# Patient Record
Sex: FEMALE | Race: WHITE | URBAN - METROPOLITAN AREA
[De-identification: names, ages, dates, MRNs, and addresses within clinical notes are randomized per-mention and may not be internally consistent; named-entity substitution may affect disease eponyms.]

---

## 2017-05-30 ENCOUNTER — OUTPATIENT (OUTPATIENT)
Dept: OUTPATIENT SERVICES | Facility: HOSPITAL | Age: 57
LOS: 1 days | Discharge: HOME | End: 2017-05-30

## 2017-05-30 DIAGNOSIS — C50.919 MALIGNANT NEOPLASM OF UNSPECIFIED SITE OF UNSPECIFIED FEMALE BREAST: ICD-10-CM

## 2017-06-02 ENCOUNTER — APPOINTMENT (OUTPATIENT)
Dept: HEMATOLOGY ONCOLOGY | Facility: CLINIC | Age: 57
End: 2017-06-02

## 2017-06-02 VITALS
HEART RATE: 74 BPM | WEIGHT: 200.62 LBS | TEMPERATURE: 97.8 F | RESPIRATION RATE: 16 BRPM | SYSTOLIC BLOOD PRESSURE: 124 MMHG | DIASTOLIC BLOOD PRESSURE: 80 MMHG | BODY MASS INDEX: 30.41 KG/M2 | OXYGEN SATURATION: 97 %

## 2017-06-02 DIAGNOSIS — I89.0 LYMPHEDEMA, NOT ELSEWHERE CLASSIFIED: ICD-10-CM

## 2017-06-02 DIAGNOSIS — R91.1 SOLITARY PULMONARY NODULE: ICD-10-CM

## 2017-06-02 DIAGNOSIS — C50.912 MALIGNANT NEOPLASM OF UNSPECIFIED SITE OF LEFT FEMALE BREAST: ICD-10-CM

## 2017-06-02 DIAGNOSIS — N95.1 MENOPAUSAL AND FEMALE CLIMACTERIC STATES: ICD-10-CM

## 2017-06-02 DIAGNOSIS — G47.00 INSOMNIA, UNSPECIFIED: ICD-10-CM

## 2017-06-02 DIAGNOSIS — M25.50 PAIN IN UNSPECIFIED JOINT: ICD-10-CM

## 2017-06-02 DIAGNOSIS — D75.1 SECONDARY POLYCYTHEMIA: ICD-10-CM

## 2021-03-08 ENCOUNTER — TRANSCRIPTION ENCOUNTER (OUTPATIENT)
Age: 61
End: 2021-03-08

## 2022-07-13 ENCOUNTER — APPOINTMENT (OUTPATIENT)
Dept: MRI IMAGING | Facility: CLINIC | Age: 62
End: 2022-07-13

## 2022-07-13 PROCEDURE — 70553 MRI BRAIN STEM W/O & W/DYE: CPT

## 2022-07-13 PROCEDURE — A9585: CPT | Mod: NC,JW

## 2023-05-12 ENCOUNTER — APPOINTMENT (OUTPATIENT)
Dept: ORTHOPEDIC SURGERY | Facility: CLINIC | Age: 63
End: 2023-05-12
Payer: COMMERCIAL

## 2023-05-12 DIAGNOSIS — M70.61 TROCHANTERIC BURSITIS, RIGHT HIP: ICD-10-CM

## 2023-05-12 DIAGNOSIS — M70.72 OTHER BURSITIS OF HIP, LEFT HIP: ICD-10-CM

## 2023-05-12 PROCEDURE — 99203 OFFICE O/P NEW LOW 30 MIN: CPT

## 2023-05-12 PROCEDURE — 73502 X-RAY EXAM HIP UNI 2-3 VIEWS: CPT

## 2023-05-12 NOTE — DISCUSSION/SUMMARY
[de-identified] : reviewed findings, anatomy and pathology  discussed and pt understands. questions answered, pt left pleased\par discuss antiinflammatories\par , diet change\par After discussion of Dx & Treatment options was discuss , pt elected to Tx non operatively with exercise , medications, physical therapy and activity modification.\par

## 2023-05-12 NOTE — PHYSICAL EXAM
[5___] : adduction 5[unfilled]/5 [1+] : posterior tibialis pulse: 1+ [Right] : right hip [AP] : anteroposterior [Lateral] : lateral [There are no fractures, subluxations or dislocations. No significant abnormalities are seen] : There are no fractures, subluxations or dislocations. No significant abnormalities are seen [Mild arthritis (Tonnis Grade 1)] : Mild arthritis (Tonnis Grade 1) [] : no pain with hip abduction [FreeTextEntry9] : small troch o spur [TWNoteComboBox7] : flexion 100 degrees [de-identified] : adduction 15 degrees [de-identified] : abduction 40 degrees [de-identified] : external rotation 40 degrees [TWNoteComboBox6] : internal rotation 30 degrees

## 2023-05-12 NOTE — HISTORY OF PRESENT ILLNESS
[Intermittent] : intermittent [de-identified] : Patient presents today for evaluation of Right hip pain. Problem has been on going since she had to commute into the city [place]. Patient states from sitting in traffic her hip and leg would go numb. She has since stopped working in the city and she states the problem did get better however it has now returned and becoming worse. She is unable to walk long distances without pain. Patient describes the pain as a dull aching pain. Patient taking OTC NSAIDs prn. Patient also complains of pain in the left shoulder.  [] : no

## 2023-05-23 ENCOUNTER — FORM ENCOUNTER (OUTPATIENT)
Age: 63
End: 2023-05-23

## 2023-07-25 ENCOUNTER — APPOINTMENT (OUTPATIENT)
Dept: ULTRASOUND IMAGING | Facility: CLINIC | Age: 63
End: 2023-07-25

## 2023-08-10 ENCOUNTER — APPOINTMENT (OUTPATIENT)
Dept: ULTRASOUND IMAGING | Facility: CLINIC | Age: 63
End: 2023-08-10
Payer: COMMERCIAL

## 2023-08-10 PROCEDURE — 76536 US EXAM OF HEAD AND NECK: CPT

## 2024-05-21 ENCOUNTER — APPOINTMENT (OUTPATIENT)
Dept: OBGYN | Facility: CLINIC | Age: 64
End: 2024-05-21
Payer: COMMERCIAL

## 2024-05-21 VITALS
SYSTOLIC BLOOD PRESSURE: 121 MMHG | HEIGHT: 68 IN | WEIGHT: 210 LBS | DIASTOLIC BLOOD PRESSURE: 85 MMHG | BODY MASS INDEX: 31.83 KG/M2

## 2024-05-21 DIAGNOSIS — Z86.59 PERSONAL HISTORY OF OTHER MENTAL AND BEHAVIORAL DISORDERS: ICD-10-CM

## 2024-05-21 DIAGNOSIS — Z01.419 ENCOUNTER FOR GYNECOLOGICAL EXAMINATION (GENERAL) (ROUTINE) W/OUT ABNORMAL FINDINGS: ICD-10-CM

## 2024-05-21 PROCEDURE — 99386 PREV VISIT NEW AGE 40-64: CPT

## 2024-05-21 RX ORDER — ALPRAZOLAM 0.5 MG/1
0.5 TABLET, EXTENDED RELEASE ORAL
Refills: 0 | Status: ACTIVE | COMMUNITY

## 2024-05-21 NOTE — DISCUSSION/SUMMARY
[FreeTextEntry1] : Assessment is atrophic vaginitis otherwise normal GYN exam status post bilateral mastectomy.  Patient will use over-the-counter lubrication if needed not a candidate for hormone replacement.  Will follow-up in 1 to 2 years or as needed.  Medical assistant Brittany was present in the room during entire examination and discussion.

## 2024-05-21 NOTE — HISTORY OF PRESENT ILLNESS
[FreeTextEntry1] : 64-year-old -0-2-0 here for yearly exam without had a Pap smear in over 4 years.  History of breast cancer status post bilateral mastectomy and has undergone chemo and radiation 11 years ago is free of disease at this time.  Takes gabapentin for peripheral neuropathies and it does help.  Only GYN complaint is of vaginal dryness.  No vaginal bleeding or discharge.  Normal urination and bowel function.  No changes in weight diet or appetite.  Past medical, surgical and family history reviewed and updated.

## 2024-05-21 NOTE — REASON FOR VISIT
[Annual] : an annual visit. [TextEntry] : Patient is a 64 year old female who presents for her annual. Last pap was 4 years ago. Last mammo was years ago. LMP was years ago. senior ma was present in the room

## 2024-05-21 NOTE — REVIEW OF SYSTEMS
[Patient Intake Form Reviewed] : Patient intake form was reviewed [Negative] : Heme/Lymph [FreeTextEntry8] : vagnal dryness

## 2024-05-21 NOTE — PHYSICAL EXAM
[Chaperone Present] : A chaperone was present in the examining room during all aspects of the physical examination [[id number]] : A chaperone was present during the pelvic exam. [FreeTextEntry2] : Brittany [Appropriately responsive] : appropriately responsive [Alert] : alert [No Acute Distress] : no acute distress [No Lymphadenopathy] : no lymphadenopathy [Regular Rate Rhythm] : regular rate rhythm [No Murmurs] : no murmurs [Clear to Auscultation B/L] : clear to auscultation bilaterally [Soft] : soft [Non-tender] : non-tender [Non-distended] : non-distended [No HSM] : No HSM [No Lesions] : no lesions [No Mass] : no mass [Oriented x3] : oriented x3 [Right Breast Absent] : a total mastectomy [Left Breast Absent] : a total mastectomy [Labia Majora] : normal [Labia Minora] : normal [Atrophy] : atrophy [No Bleeding] : There was no active vaginal bleeding [Normal] : normal [Tenderness] : nontender [Anteversion] : anteverted [Uterine Adnexae] : non-palpable [No Tenderness] : no tenderness [Nl Sphincter Tone] : normal sphincter tone [FreeTextEntry5] : Flush with the posterior wall difficult to fully visualize

## 2024-05-23 LAB — HPV HIGH+LOW RISK DNA PNL CVX: NOT DETECTED

## 2024-05-26 LAB — CYTOLOGY CVX/VAG DOC THIN PREP: NORMAL

## 2025-06-27 ENCOUNTER — NON-APPOINTMENT (OUTPATIENT)
Age: 65
End: 2025-06-27

## 2025-06-27 ENCOUNTER — APPOINTMENT (OUTPATIENT)
Dept: OPHTHALMOLOGY | Facility: CLINIC | Age: 65
End: 2025-06-27
Payer: COMMERCIAL

## 2025-06-27 PROCEDURE — 92004 COMPRE OPH EXAM NEW PT 1/>: CPT

## 2025-06-27 PROCEDURE — 92133 CPTRZD OPH DX IMG PST SGM ON: CPT

## 2025-06-27 PROCEDURE — 76514 ECHO EXAM OF EYE THICKNESS: CPT

## 2025-08-26 ENCOUNTER — APPOINTMENT (OUTPATIENT)
Dept: OPHTHALMOLOGY | Facility: CLINIC | Age: 65
End: 2025-08-26

## 2025-09-08 ENCOUNTER — APPOINTMENT (OUTPATIENT)
Dept: OPHTHALMOLOGY | Facility: CLINIC | Age: 65
End: 2025-09-08